# Patient Record
Sex: MALE | Race: WHITE | RURAL
[De-identification: names, ages, dates, MRNs, and addresses within clinical notes are randomized per-mention and may not be internally consistent; named-entity substitution may affect disease eponyms.]

---

## 2024-03-08 ENCOUNTER — APPOINTMENT (RX ONLY)
Dept: RURAL CLINIC 2 | Facility: CLINIC | Age: 34
Setting detail: DERMATOLOGY
End: 2024-03-08

## 2024-03-08 DIAGNOSIS — L72.0 EPIDERMAL CYST: ICD-10-CM

## 2024-03-08 PROCEDURE — ? COUNSELING

## 2024-03-08 PROCEDURE — ? ADDITIONAL NOTES

## 2024-03-08 PROCEDURE — ? REFERRAL

## 2024-03-08 NOTE — PROCEDURE: ADDITIONAL NOTES
Additional Notes: In view of location in the posterior triangle, pt referred to ENT for evaluation and excision.
Detail Level: Simple
Render Risk Assessment In Note?: no